# Patient Record
Sex: FEMALE | Race: WHITE | NOT HISPANIC OR LATINO | URBAN - METROPOLITAN AREA
[De-identification: names, ages, dates, MRNs, and addresses within clinical notes are randomized per-mention and may not be internally consistent; named-entity substitution may affect disease eponyms.]

---

## 2019-01-17 ENCOUNTER — IMPORTED ENCOUNTER (OUTPATIENT)
Dept: URBAN - METROPOLITAN AREA CLINIC 31 | Facility: CLINIC | Age: 79
End: 2019-01-17

## 2019-01-17 PROBLEM — H25.813: Noted: 2019-01-17

## 2019-01-17 PROCEDURE — 99204 OFFICE O/P NEW MOD 45 MIN: CPT

## 2019-01-17 PROCEDURE — 92015 DETERMINE REFRACTIVE STATE: CPT

## 2019-01-17 NOTE — PATIENT DISCUSSION
Discussed the risks benefits alternatives and limitations of cataract surgery including infection bleeding loss of vision retinal tears detachment. The patient stated a full understanding and a desire to proceed with the procedure in both eyes. Refractive options were reviewed. Patient has elected to be optimized for distance vision in both eyes. The patient will still need glasses for reading and to possibly fine tune distance vision. kinjal admit t/c toric if corneal astigmatismSchedule KPE/IOL OD/OSReturn for an appointment for [address] [place] [address] with Dr. Zheng Lozano.

## 2019-04-10 ENCOUNTER — IMPORTED ENCOUNTER (OUTPATIENT)
Dept: URBAN - METROPOLITAN AREA CLINIC 31 | Facility: CLINIC | Age: 79
End: 2019-04-10

## 2019-04-10 PROBLEM — H25.813: Noted: 2019-04-10

## 2019-04-10 PROCEDURE — 76519 ECHO EXAM OF EYE: CPT

## 2019-04-23 ENCOUNTER — IMPORTED ENCOUNTER (OUTPATIENT)
Dept: URBAN - METROPOLITAN AREA CLINIC 31 | Facility: CLINIC | Age: 79
End: 2019-04-23

## 2019-04-23 PROBLEM — Z96.1: Noted: 2019-04-23

## 2019-04-23 PROCEDURE — 99024 POSTOP FOLLOW-UP VISIT: CPT

## 2019-04-23 NOTE — PATIENT DISCUSSION
1.  Pseudophakia OS - Post-Op Day #1 - Cataract Surgery Left Eye (OS) -  Dist--- doing well. Continue po drops as instructed. Call office with symptoms of pain redness or decreased vision left eye. Tears prn.2. Cat sx OD 1 week3.   RTN 1 week PO

## 2019-04-30 ENCOUNTER — IMPORTED ENCOUNTER (OUTPATIENT)
Dept: URBAN - METROPOLITAN AREA CLINIC 31 | Facility: CLINIC | Age: 79
End: 2019-04-30

## 2019-04-30 PROBLEM — Z96.1: Noted: 2019-04-30

## 2019-04-30 PROCEDURE — 99024 POSTOP FOLLOW-UP VISIT: CPT

## 2019-04-30 NOTE — PATIENT DISCUSSION
1.  Cataract Surgery Left Eye (OS) -     1 week PO----  Dist---Intraocular lens stable and surgery very well healed. Patient to resume all normal activities. Finish postop drops as directed. Final Refraction given if necessary. Call with any problems. 2. Cataract Surgery Right Eye (OD) -    1 day PO-- Dist----doing well. Tears prn. Continue postop drops as directed. Call office with symptoms of pain redness or decreased vision in operative eye. 1 drop zylet instilled. 3. Pt will use OTc or her old rx. 4.  Use more tears qid. 5.   RTN 1 week PO

## 2019-05-09 ENCOUNTER — IMPORTED ENCOUNTER (OUTPATIENT)
Dept: URBAN - METROPOLITAN AREA CLINIC 31 | Facility: CLINIC | Age: 79
End: 2019-05-09

## 2019-05-09 PROBLEM — Z96.1: Noted: 2019-05-09

## 2019-05-09 PROCEDURE — 99024 POSTOP FOLLOW-UP VISIT: CPT

## 2019-05-09 PROCEDURE — 92015 DETERMINE REFRACTIVE STATE: CPT

## 2019-05-09 NOTE — PATIENT DISCUSSION
1.  Post-Op Week #1 - Cataract Surgery Right Eye (OD) - Intraocular lens stable and surgery very well healed. Patient to resume all normal activities. Finish postop drops as directed. Final Refraction given if necessary. Call with any problems. 2. Post-Op Week #2 - Cataract Surgery Left Eye (OS) -  Intraocular lens stable and surgery very well healed. Patient to resume all normal activities. Finish postop drops as directed. Final Refraction given if necessary. 3. Return for an appointment in 6 months for dilated fundus exam. with Dr. Audrey Gorman.

## 2020-05-14 ENCOUNTER — IMPORTED ENCOUNTER (OUTPATIENT)
Dept: URBAN - METROPOLITAN AREA CLINIC 31 | Facility: CLINIC | Age: 80
End: 2020-05-14

## 2020-05-14 PROBLEM — H10.403: Noted: 2020-05-14

## 2020-05-14 PROBLEM — Z96.1: Noted: 2020-05-14

## 2020-05-14 PROBLEM — H04.123: Noted: 2020-05-14

## 2020-05-14 PROCEDURE — 92014 COMPRE OPH EXAM EST PT 1/>: CPT

## 2020-05-14 PROCEDURE — 92015 DETERMINE REFRACTIVE STATE: CPT

## 2020-05-14 NOTE — PATIENT DISCUSSION
1.  Pseudophakia OU - IOLs stable. Monitor for changes in vision. 2. Dry Eyes OU:  USes Claritan D for allergies use frequent tears. 3. Allergic Conjunctivitis OU -- The condition was  discussed with the patient. Avoidance of allergens and cool compresses were recommended. Bepreve sample given. [facility] available [address] for an appointment in 12 months for comprehensive exam. with Dr. Lubna Melendrez.

## 2020-05-14 NOTE — PATIENT DISCUSSION
Allergic Conjunctivitis OU -- The condition was  discussed with the patient. Avoidance of allergens and cool compresses were recommended. Bepreve sample given. [facility] available [address] for an appointment in 12 months for comprehensive exam. with Dr. Olga Argueta.

## 2020-05-27 PROBLEM — Z96.1: Noted: 2020-05-27

## 2020-05-27 PROBLEM — H04.123: Noted: 2020-05-27

## 2020-05-27 PROBLEM — H10.403: Noted: 2020-05-27

## 2020-11-19 ENCOUNTER — IMPORTED ENCOUNTER (OUTPATIENT)
Dept: URBAN - METROPOLITAN AREA CLINIC 31 | Facility: CLINIC | Age: 80
End: 2020-11-19

## 2021-05-06 ENCOUNTER — IMPORTED ENCOUNTER (OUTPATIENT)
Dept: URBAN - METROPOLITAN AREA CLINIC 31 | Facility: CLINIC | Age: 81
End: 2021-05-06

## 2021-05-06 PROBLEM — Z96.1: Noted: 2021-05-06

## 2021-05-06 PROBLEM — H10.403: Noted: 2021-05-06

## 2021-05-06 PROBLEM — H26.493: Noted: 2021-05-06

## 2021-05-06 PROCEDURE — 92014 COMPRE OPH EXAM EST PT 1/>: CPT

## 2021-05-06 NOTE — PATIENT DISCUSSION
1.  Pseudophakia OU - IOLs stable. Monitor for changes in vision. 2. PCO OU: (Posterior Capsule Opacification)  Not visually significant at this time. Monitor for yag capsulotomy necessity. 3. Allergic Conjunctivitis OU -- The condition was  discussed with the patient. Avoidance of allergens and cool compresses were recommended. USES pataday prn4. Return for an appointment in 12 months for comprehensive exam. with Dr. Timothy Padron.

## 2022-04-02 ASSESSMENT — TONOMETRY
OS_IOP_MMHG: 25
OS_IOP_MMHG: 24
OD_IOP_MMHG: 25
OD_IOP_MMHG: 19
OD_IOP_MMHG: 21
OS_IOP_MMHG: 20
OD_IOP_MMHG: 24
OD_IOP_MMHG: 18
OS_IOP_MMHG: 26
OS_IOP_MMHG: 18
OD_IOP_MMHG: 20
OS_IOP_MMHG: 21

## 2022-04-02 ASSESSMENT — VISUAL ACUITY
OD_CC: 20/20-2
OD_CC: J1+15''
OS_CC: 20/25+1
OS_CC: 20/20
OD_CC: 20/30+2
OS_SC: 20/20
OD_CC: J2
OD_CC: 20/50-1
OD_CC: 20/20
OS_GLARE: 20/400
OD_SC: 20/20
OD_PH: CC 20/30 +2
OS_CC: J1+
OS_CC: 20/50
OS_SC: 20/30
OS_CC: 20/20-1
OS_CC: J115''
OD_CC: 20/40
OS_CC: 20/20
OD_SC: 20/50
OD_CC: 20/20-2
OS_CC: 20/20-1
OD_GLARE: 20/400
OS_CC: J1
OS_CC: 20/30-2
OD_CC: J1+
OD_CC: 20/20

## 2022-04-14 ENCOUNTER — COMPREHENSIVE EXAM (OUTPATIENT)
Dept: URBAN - METROPOLITAN AREA CLINIC 34 | Facility: CLINIC | Age: 82
End: 2022-04-14

## 2022-04-14 ENCOUNTER — PREPPED CHART (OUTPATIENT)
Dept: URBAN - METROPOLITAN AREA CLINIC 34 | Facility: CLINIC | Age: 82
End: 2022-04-14

## 2022-04-14 DIAGNOSIS — H43.813: ICD-10-CM

## 2022-04-14 DIAGNOSIS — Z96.1: ICD-10-CM

## 2022-04-14 DIAGNOSIS — H26.493: ICD-10-CM

## 2022-04-14 DIAGNOSIS — H40.013: ICD-10-CM

## 2022-04-14 DIAGNOSIS — H10.10: ICD-10-CM

## 2022-04-14 PROCEDURE — 92014 COMPRE OPH EXAM EST PT 1/>: CPT

## 2022-04-14 ASSESSMENT — VISUAL ACUITY
OS_CC: 20/25
OD_BAT: 20/50
OS_BAT: 20/80
OD_CC: 20/30-2

## 2022-04-14 ASSESSMENT — TONOMETRY
OD_IOP_MMHG: 15
OS_IOP_MMHG: 16

## 2022-04-14 NOTE — PATIENT DISCUSSION
RN Triage: follow-up needed after ER for possible post-op infection.  [place]'t follow-up in person with that provider.  Thanks!    Symptoms of Retinal tear and detachment reviewed. Patient understands to call immediately with any such symptoms.

## 2022-04-14 NOTE — PATIENT DISCUSSION
Discussed the risks/benefits of laser capsulotomy. Laser recommended. Patient elects to proceed. os/od.

## 2022-04-14 NOTE — PATIENT DISCUSSION
1.  Pseudophakia OU - IOLs stable. Monitor for changes in vision. 2. PCO OU: (Posterior Capsule Opacification)  Not visually significant at this time. Monitor for yag capsulotomy necessity. 3. Allergic Conjunctivitis OU -- The condition was  discussed with the patient. Avoidance of allergens and cool compresses were recommended. USES pataday prn4. Return for an appointment in 12 months for comprehensive exam. with Dr. Beckie Lewis

## 2022-05-03 ENCOUNTER — SURGERY/PROCEDURE (OUTPATIENT)
Dept: URBAN - METROPOLITAN AREA SURGERY 17 | Facility: SURGERY | Age: 82
End: 2022-05-03

## 2022-05-03 DIAGNOSIS — H26.492: ICD-10-CM

## 2022-05-03 PROCEDURE — 66821 AFTER CATARACT LASER SURGERY: CPT

## 2022-05-03 NOTE — PATIENT DISCUSSION
1.  Pseudophakia OU - IOLs stable. Monitor for changes in vision. 2. PCO OU: (Posterior Capsule Opacification)  Not visually significant at this time. Monitor for yag capsulotomy necessity. 3. Allergic Conjunctivitis OU -- The condition was  discussed with the patient. Avoidance of allergens and cool compresses were recommended. USES pataday prn4. Return for an appointment in 12 months for comprehensive exam. with Dr. Keanu Finley.

## 2022-05-17 ENCOUNTER — SURGERY/PROCEDURE (OUTPATIENT)
Dept: URBAN - METROPOLITAN AREA SURGERY 17 | Facility: SURGERY | Age: 82
End: 2022-05-17

## 2022-05-17 DIAGNOSIS — H26.491: ICD-10-CM

## 2022-05-17 PROCEDURE — 66821 AFTER CATARACT LASER SURGERY: CPT

## 2022-11-03 ENCOUNTER — FOLLOW UP (OUTPATIENT)
Dept: URBAN - METROPOLITAN AREA CLINIC 34 | Facility: CLINIC | Age: 82
End: 2022-11-03

## 2022-11-03 DIAGNOSIS — H43.813: ICD-10-CM

## 2022-11-03 DIAGNOSIS — Z98.890: ICD-10-CM

## 2022-11-03 DIAGNOSIS — H40.013: ICD-10-CM

## 2022-11-03 DIAGNOSIS — H04.222: ICD-10-CM

## 2022-11-03 DIAGNOSIS — Z96.1: ICD-10-CM

## 2022-11-03 DIAGNOSIS — H10.13: ICD-10-CM

## 2022-11-03 PROCEDURE — 68801 DILATE TEAR DUCT OPENING: CPT

## 2022-11-03 PROCEDURE — 99214 OFFICE O/P EST MOD 30 MIN: CPT

## 2022-11-03 ASSESSMENT — VISUAL ACUITY
OD_SC: 20/30-2
OS_SC: 20/30-2

## 2022-11-03 NOTE — PROCEDURE NOTE: CLINICAL
PROCEDURE NOTE: Dilation of Lacrimal Punctum, With or Without Irrigation OS. Diagnosis: Epiphora Due to Insufficient Drainage. Anesthesia: Topical. Prior to treatment, the risks/benefits/alternatives were discussed. The patient wished to proceed with procedure. A time out to confirm the patient, site, and procedure has been achieved. The site has been marked. The punctum was dilated with a punctum dilator. There was some resistance encountered as puncta was entered and unable to cannulate NLD for irrigation, pt uncomfortable. Patient tolerated procedure well. There were no complications. Post op instructions given. Crystal Thomas

## 2022-11-03 NOTE — PATIENT DISCUSSION
Risks and benefits of probing and irrigation of NLD discussed. Plan today.  Confirms NLD obstruction OS refer Oculoplastics for evaluation.

## 2024-05-15 ENCOUNTER — COMPREHENSIVE EXAM (OUTPATIENT)
Dept: URBAN - METROPOLITAN AREA CLINIC 34 | Facility: CLINIC | Age: 84
End: 2024-05-15

## 2024-05-15 DIAGNOSIS — H43.813: ICD-10-CM

## 2024-05-15 DIAGNOSIS — Z96.1: ICD-10-CM

## 2024-05-15 DIAGNOSIS — H40.1131: ICD-10-CM

## 2024-05-15 DIAGNOSIS — H04.222: ICD-10-CM

## 2024-05-15 DIAGNOSIS — H40.013: ICD-10-CM

## 2024-05-15 PROCEDURE — 92250 FUNDUS PHOTOGRAPHY W/I&R: CPT

## 2024-05-15 PROCEDURE — 92014 COMPRE OPH EXAM EST PT 1/>: CPT

## 2024-05-15 PROCEDURE — 92083 EXTENDED VISUAL FIELD XM: CPT

## 2024-05-15 PROCEDURE — 92015 DETERMINE REFRACTIVE STATE: CPT

## 2024-05-15 ASSESSMENT — VISUAL ACUITY
OS_CC: 20/20
OD_CC: 20/25-1
OS_CC: 20/20-1
OD_CC: 20/40+2

## 2024-05-15 ASSESSMENT — TONOMETRY
OS_IOP_MMHG: 25
OD_IOP_MMHG: 26

## 2024-06-14 ENCOUNTER — ESTABLISHED PATIENT (OUTPATIENT)
Dept: URBAN - METROPOLITAN AREA CLINIC 34 | Facility: CLINIC | Age: 84
End: 2024-06-14

## 2024-06-14 DIAGNOSIS — H40.1131: ICD-10-CM

## 2024-06-14 DIAGNOSIS — Z96.1: ICD-10-CM

## 2024-06-14 PROCEDURE — 99213 OFFICE O/P EST LOW 20 MIN: CPT

## 2024-06-14 ASSESSMENT — VISUAL ACUITY
OS_SC: 20/25-1
OD_SC: 20/20-2

## 2024-06-14 ASSESSMENT — TONOMETRY
OS_IOP_MMHG: 17
OD_IOP_MMHG: 15

## 2024-08-20 ENCOUNTER — FOLLOW UP (OUTPATIENT)
Dept: URBAN - METROPOLITAN AREA CLINIC 34 | Facility: CLINIC | Age: 84
End: 2024-08-20

## 2024-08-20 DIAGNOSIS — H11.31: ICD-10-CM

## 2024-08-20 DIAGNOSIS — H40.1131: ICD-10-CM

## 2024-08-20 DIAGNOSIS — Z96.1: ICD-10-CM

## 2024-08-20 PROCEDURE — 99213 OFFICE O/P EST LOW 20 MIN: CPT

## 2024-08-20 ASSESSMENT — TONOMETRY
OD_IOP_MMHG: 16
OS_IOP_MMHG: 19

## 2024-08-20 ASSESSMENT — VISUAL ACUITY
OS_CC: 20/20-2
OD_CC: 20/25-3

## 2024-09-18 ENCOUNTER — FOLLOW UP (OUTPATIENT)
Dept: URBAN - METROPOLITAN AREA CLINIC 34 | Facility: CLINIC | Age: 84
End: 2024-09-18

## 2024-09-18 DIAGNOSIS — H40.1131: ICD-10-CM

## 2024-09-18 DIAGNOSIS — Z96.1: ICD-10-CM

## 2024-09-18 PROCEDURE — 99213 OFFICE O/P EST LOW 20 MIN: CPT

## 2024-11-13 ENCOUNTER — FOLLOW UP (OUTPATIENT)
Dept: URBAN - METROPOLITAN AREA CLINIC 34 | Facility: CLINIC | Age: 84
End: 2024-11-13

## 2024-11-13 DIAGNOSIS — H04.123: ICD-10-CM

## 2024-11-13 DIAGNOSIS — Z96.1: ICD-10-CM

## 2024-11-13 DIAGNOSIS — H40.1131: ICD-10-CM

## 2024-11-13 PROCEDURE — 92133 CPTRZD OPH DX IMG PST SGM ON: CPT

## 2024-11-13 PROCEDURE — 99214 OFFICE O/P EST MOD 30 MIN: CPT

## 2024-12-13 ENCOUNTER — FOLLOW UP (OUTPATIENT)
Age: 84
End: 2024-12-13

## 2024-12-13 DIAGNOSIS — H40.1131: ICD-10-CM

## 2024-12-13 DIAGNOSIS — H04.123: ICD-10-CM

## 2024-12-13 DIAGNOSIS — Z96.1: ICD-10-CM

## 2024-12-13 PROCEDURE — 99213 OFFICE O/P EST LOW 20 MIN: CPT

## 2024-12-13 RX ORDER — TIMOLOL MALEATE 5 MG/ML: 1 SOLUTION OPHTHALMIC EVERY 12 HOURS

## 2025-06-20 ENCOUNTER — COMPREHENSIVE EXAM (OUTPATIENT)
Age: 85
End: 2025-06-20

## 2025-06-20 DIAGNOSIS — H40.1131: ICD-10-CM

## 2025-06-20 DIAGNOSIS — J30.1: ICD-10-CM

## 2025-06-20 DIAGNOSIS — Z96.1: ICD-10-CM

## 2025-06-20 DIAGNOSIS — H52.4: ICD-10-CM

## 2025-06-20 DIAGNOSIS — H04.123: ICD-10-CM

## 2025-06-20 PROCEDURE — 92083 EXTENDED VISUAL FIELD XM: CPT

## 2025-06-20 PROCEDURE — 92014 COMPRE OPH EXAM EST PT 1/>: CPT

## 2025-06-20 PROCEDURE — 92015 DETERMINE REFRACTIVE STATE: CPT

## 2025-06-20 PROCEDURE — 92250 FUNDUS PHOTOGRAPHY W/I&R: CPT
